# Patient Record
Sex: FEMALE | Race: WHITE | Employment: FULL TIME | URBAN - METROPOLITAN AREA
[De-identification: names, ages, dates, MRNs, and addresses within clinical notes are randomized per-mention and may not be internally consistent; named-entity substitution may affect disease eponyms.]

---

## 2017-01-03 ENCOUNTER — DOCUMENTATION ONLY (OUTPATIENT)
Dept: NUTRITION | Age: 22
End: 2017-01-03

## 2017-01-03 NOTE — PROGRESS NOTES
Nutrition Counseling:  Pt has not returned a call to reschedule initial.Will close referral and notify referring physician.     [facility], [address], [address],CSSD, [address] [place] [address]  [phone]